# Patient Record
Sex: FEMALE | Race: WHITE | URBAN - METROPOLITAN AREA
[De-identification: names, ages, dates, MRNs, and addresses within clinical notes are randomized per-mention and may not be internally consistent; named-entity substitution may affect disease eponyms.]

---
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
## 2025-05-09 ENCOUNTER — OFFICE VISIT (OUTPATIENT)
Age: 20
End: 2025-05-09

## 2025-05-09 VITALS
WEIGHT: 140.2 LBS | HEART RATE: 91 BPM | SYSTOLIC BLOOD PRESSURE: 106 MMHG | OXYGEN SATURATION: 97 % | DIASTOLIC BLOOD PRESSURE: 69 MMHG | TEMPERATURE: 99.3 F

## 2025-05-09 DIAGNOSIS — J03.90 ACUTE TONSILLITIS, UNSPECIFIED ETIOLOGY: Primary | ICD-10-CM

## 2025-05-09 RX ORDER — PREDNISONE 20 MG/1
20 TABLET ORAL 2 TIMES DAILY
Qty: 10 TABLET | Refills: 0 | Status: SHIPPED | OUTPATIENT
Start: 2025-05-09 | End: 2025-05-14

## 2025-05-09 RX ORDER — CEPHALEXIN 500 MG/1
500 CAPSULE ORAL 3 TIMES DAILY
Qty: 30 CAPSULE | Refills: 0 | Status: SHIPPED | OUTPATIENT
Start: 2025-05-09 | End: 2025-05-19

## 2025-05-09 ASSESSMENT — ENCOUNTER SYMPTOMS
SHORTNESS OF BREATH: 0
NAUSEA: 0
SORE THROAT: 1
VOMITING: 0
WHEEZING: 0
RHINORRHEA: 0
DIARRHEA: 0
ABDOMINAL PAIN: 0
COUGH: 0

## 2025-05-09 NOTE — PROGRESS NOTES
Raeann Brunner (:  2005) is a 20 y.o. female,Established patient, here for evaluation of the following chief complaint(s):  Pharyngitis (Patient complains of a sore throat x 1 week, tonsils swollen for 2 days.)      ASSESSMENT/PLAN:  1. Acute tonsillitis, unspecified etiology    - cephALEXin (KEFLEX) 500 MG capsule; Take 1 capsule by mouth 3 times daily for 10 days  Dispense: 30 capsule; Refill: 0  - predniSONE (DELTASONE) 20 MG tablet; Take 1 tablet by mouth 2 times daily for 5 days  Dispense: 10 tablet; Refill: 0     -salt water gargle,increase fluid intake,take Tylenol as needed.    Return if symptoms worsen or fail to improve.    SUBJECTIVE/OBJECTIVE:    History provided by:  Patient  Pharyngitis  Severity:  Moderate  Onset quality:  Gradual  Duration:  7 days  Timing:  Constant  Progression:  Worsening  Chronicity:  New  Associated symptoms: sore throat    Associated symptoms: no abdominal pain, no chest pain, no congestion, no cough, no diarrhea, no ear pain, no fatigue, no fever, no headaches, no loss of consciousness, no myalgias, no nausea, no rash, no rhinorrhea, no shortness of breath, no vomiting and no wheezing        Vitals:    25 0927   BP: 106/69   BP Site: Right Upper Arm   Patient Position: Sitting   BP Cuff Size: Medium Adult   Pulse: 91   Temp: 99.3 °F (37.4 °C)   TempSrc: Oral   SpO2: 97%   Weight: 63.6 kg (140 lb 3.2 oz)       Review of Systems   Constitutional:  Negative for fatigue and fever.   HENT:  Positive for sore throat. Negative for congestion, ear pain and rhinorrhea.    Respiratory:  Negative for cough, shortness of breath and wheezing.    Cardiovascular:  Negative for chest pain.   Gastrointestinal:  Negative for abdominal pain, diarrhea, nausea and vomiting.   Musculoskeletal:  Negative for myalgias.   Skin:  Negative for rash.   Neurological:  Negative for loss of consciousness and headaches.       Physical Exam  Constitutional:       General: She is not in acute

## 2025-06-07 ENCOUNTER — OFFICE VISIT (OUTPATIENT)
Age: 20
End: 2025-06-07

## 2025-06-07 VITALS
TEMPERATURE: 98.6 F | DIASTOLIC BLOOD PRESSURE: 78 MMHG | WEIGHT: 136 LBS | OXYGEN SATURATION: 95 % | HEART RATE: 92 BPM | SYSTOLIC BLOOD PRESSURE: 123 MMHG

## 2025-06-07 DIAGNOSIS — J32.9 SINOBRONCHITIS: Primary | ICD-10-CM

## 2025-06-07 DIAGNOSIS — J40 SINOBRONCHITIS: Primary | ICD-10-CM

## 2025-06-07 RX ORDER — GUAIFENESIN, PSEUDOEPHEDRINE HYDROCHLORIDE 600; 60 MG/1; MG/1
1 TABLET, EXTENDED RELEASE ORAL EVERY 12 HOURS
Qty: 14 TABLET | Refills: 0 | Status: SHIPPED | OUTPATIENT
Start: 2025-06-07 | End: 2025-06-14

## 2025-06-07 RX ORDER — PREDNISONE 20 MG/1
20 TABLET ORAL 2 TIMES DAILY
Qty: 10 TABLET | Refills: 0 | Status: SHIPPED | OUTPATIENT
Start: 2025-06-07 | End: 2025-06-12

## 2025-06-07 RX ORDER — FLUTICASONE PROPIONATE 50 MCG
2 SPRAY, SUSPENSION (ML) NASAL DAILY
Qty: 16 G | Refills: 0 | Status: SHIPPED | OUTPATIENT
Start: 2025-06-07

## 2025-06-07 RX ORDER — CETIRIZINE HYDROCHLORIDE 10 MG/1
10 TABLET ORAL DAILY
Qty: 30 TABLET | Refills: 0 | Status: SHIPPED | OUTPATIENT
Start: 2025-06-07

## 2025-06-07 ASSESSMENT — ENCOUNTER SYMPTOMS
WHEEZING: 1
COUGH: 1

## 2025-06-07 NOTE — PATIENT INSTRUCTIONS
Prescription for Augmentin, prednisone, Zyrtec, Flonase, and Mucinex D.   Drink plenty of fluids to thin mucus.  Sleep with your head propped up on a pillow.  Inhale steam three of four times daily( exp: sit in bathroom with hot shower running.)  Avoid Trigger and not smoking.  May also clean nasal passages with salt water to ease congestion. Use over-the-counter Tylenol and Motrin for pain or fever.  Follow-up with their PCP  in 3 to 5 days and worsening symptoms.

## 2025-06-07 NOTE — PROGRESS NOTES
submental, submandibular, tonsillar or occipital adenopathy.      Upper Body:      Right upper body: No supraclavicular adenopathy.      Left upper body: No supraclavicular adenopathy.   Skin:     General: Skin is warm and dry.      Capillary Refill: Capillary refill takes less than 2 seconds.      Coloration: Skin is not pale.      Findings: No rash.   Neurological:      General: No focal deficit present.      Mental Status: She is alert and oriented to person, place, and time. She is not disoriented.   Psychiatric:         Mood and Affect: Mood normal.         Behavior: Behavior is cooperative.               An electronic signature was used to authenticate this note.    Nusrat Riojas, SWAPNIL - CNP